# Patient Record
Sex: FEMALE | Race: OTHER | HISPANIC OR LATINO | Employment: FULL TIME | URBAN - METROPOLITAN AREA
[De-identification: names, ages, dates, MRNs, and addresses within clinical notes are randomized per-mention and may not be internally consistent; named-entity substitution may affect disease eponyms.]

---

## 2022-12-22 ENCOUNTER — HOSPITAL ENCOUNTER (EMERGENCY)
Facility: HOSPITAL | Age: 42
Discharge: HOME | End: 2022-12-23
Attending: EMERGENCY MEDICINE

## 2022-12-22 VITALS
OXYGEN SATURATION: 98 % | SYSTOLIC BLOOD PRESSURE: 144 MMHG | HEART RATE: 78 BPM | BODY MASS INDEX: 44.66 KG/M2 | DIASTOLIC BLOOD PRESSURE: 77 MMHG | RESPIRATION RATE: 18 BRPM | WEIGHT: 252.13 LBS | TEMPERATURE: 99 F

## 2022-12-22 DIAGNOSIS — R79.89 ELEVATED TROPONIN: Primary | ICD-10-CM

## 2022-12-22 DIAGNOSIS — R00.2 PALPITATIONS: ICD-10-CM

## 2022-12-22 LAB
ALBUMIN SERPL BCP-MCNC: 4.6 G/DL (ref 3.5–5.2)
ALP SERPL-CCNC: 91 U/L (ref 55–135)
ALT SERPL W/O P-5'-P-CCNC: 42 U/L (ref 10–44)
ANION GAP SERPL CALC-SCNC: 14 MMOL/L (ref 8–16)
AST SERPL-CCNC: 27 U/L (ref 10–40)
BASOPHILS # BLD AUTO: 0.04 K/UL (ref 0–0.2)
BASOPHILS NFR BLD: 0.5 % (ref 0–1.9)
BILIRUB SERPL-MCNC: 0.5 MG/DL (ref 0.1–1)
BUN SERPL-MCNC: 10 MG/DL (ref 6–20)
CALCIUM SERPL-MCNC: 10 MG/DL (ref 8.7–10.5)
CHLORIDE SERPL-SCNC: 104 MMOL/L (ref 95–110)
CO2 SERPL-SCNC: 24 MMOL/L (ref 23–29)
CREAT SERPL-MCNC: 0.8 MG/DL (ref 0.5–1.4)
DIFFERENTIAL METHOD: NORMAL
EOSINOPHIL # BLD AUTO: 0.2 K/UL (ref 0–0.5)
EOSINOPHIL NFR BLD: 2 % (ref 0–8)
ERYTHROCYTE [DISTWIDTH] IN BLOOD BY AUTOMATED COUNT: 12.7 % (ref 11.5–14.5)
EST. GFR  (NO RACE VARIABLE): >60 ML/MIN/1.73 M^2
GLUCOSE SERPL-MCNC: 95 MG/DL (ref 70–110)
HCT VFR BLD AUTO: 39.7 % (ref 37–48.5)
HGB BLD-MCNC: 13.2 G/DL (ref 12–16)
IMM GRANULOCYTES # BLD AUTO: 0.03 K/UL (ref 0–0.04)
IMM GRANULOCYTES NFR BLD AUTO: 0.4 % (ref 0–0.5)
LIPASE SERPL-CCNC: 18 U/L (ref 4–60)
LYMPHOCYTES # BLD AUTO: 2.9 K/UL (ref 1–4.8)
LYMPHOCYTES NFR BLD: 36.6 % (ref 18–48)
MCH RBC QN AUTO: 29 PG (ref 27–31)
MCHC RBC AUTO-ENTMCNC: 33.2 G/DL (ref 32–36)
MCV RBC AUTO: 87 FL (ref 82–98)
MONOCYTES # BLD AUTO: 0.4 K/UL (ref 0.3–1)
MONOCYTES NFR BLD: 5 % (ref 4–15)
NEUTROPHILS # BLD AUTO: 4.4 K/UL (ref 1.8–7.7)
NEUTROPHILS NFR BLD: 55.5 % (ref 38–73)
NRBC BLD-RTO: 0 /100 WBC
PLATELET # BLD AUTO: 174 K/UL (ref 150–450)
PMV BLD AUTO: 12.9 FL (ref 9.2–12.9)
POTASSIUM SERPL-SCNC: 3.9 MMOL/L (ref 3.5–5.1)
PROT SERPL-MCNC: 8.7 G/DL (ref 6–8.4)
RBC # BLD AUTO: 4.55 M/UL (ref 4–5.4)
SODIUM SERPL-SCNC: 142 MMOL/L (ref 136–145)
TROPONIN I SERPL DL<=0.01 NG/ML-MCNC: 0.03 NG/ML (ref 0–0.03)
WBC # BLD AUTO: 7.95 K/UL (ref 3.9–12.7)

## 2022-12-22 PROCEDURE — 93010 EKG 12-LEAD: ICD-10-PCS | Mod: ,,, | Performed by: STUDENT IN AN ORGANIZED HEALTH CARE EDUCATION/TRAINING PROGRAM

## 2022-12-22 PROCEDURE — 83690 ASSAY OF LIPASE: CPT | Performed by: NURSE PRACTITIONER

## 2022-12-22 PROCEDURE — 80053 COMPREHEN METABOLIC PANEL: CPT | Performed by: NURSE PRACTITIONER

## 2022-12-22 PROCEDURE — 93010 ELECTROCARDIOGRAM REPORT: CPT | Mod: ,,, | Performed by: STUDENT IN AN ORGANIZED HEALTH CARE EDUCATION/TRAINING PROGRAM

## 2022-12-22 PROCEDURE — 93005 ELECTROCARDIOGRAM TRACING: CPT

## 2022-12-22 PROCEDURE — 99285 EMERGENCY DEPT VISIT HI MDM: CPT | Mod: 25

## 2022-12-22 PROCEDURE — 84484 ASSAY OF TROPONIN QUANT: CPT | Performed by: NURSE PRACTITIONER

## 2022-12-22 PROCEDURE — 85025 COMPLETE CBC W/AUTO DIFF WBC: CPT | Performed by: NURSE PRACTITIONER

## 2022-12-23 LAB
B-HCG UR QL: NEGATIVE
BACTERIA #/AREA URNS HPF: NORMAL /HPF
BILIRUB UR QL STRIP: NEGATIVE
CLARITY UR: CLEAR
COLOR UR: YELLOW
GLUCOSE UR QL STRIP: NEGATIVE
HGB UR QL STRIP: ABNORMAL
KETONES UR QL STRIP: NEGATIVE
LEUKOCYTE ESTERASE UR QL STRIP: NEGATIVE
MICROSCOPIC COMMENT: NORMAL
NITRITE UR QL STRIP: NEGATIVE
PH UR STRIP: 6 [PH] (ref 5–8)
PROT UR QL STRIP: ABNORMAL
RBC #/AREA URNS HPF: 2 /HPF (ref 0–4)
SP GR UR STRIP: 1.02 (ref 1–1.03)
SQUAMOUS #/AREA URNS HPF: 6 /HPF
TROPONIN I SERPL DL<=0.01 NG/ML-MCNC: 0.01 NG/ML (ref 0–0.03)
URN SPEC COLLECT METH UR: ABNORMAL
UROBILINOGEN UR STRIP-ACNC: NEGATIVE EU/DL
WBC #/AREA URNS HPF: 4 /HPF (ref 0–5)

## 2022-12-23 PROCEDURE — 81000 URINALYSIS NONAUTO W/SCOPE: CPT | Performed by: NURSE PRACTITIONER

## 2022-12-23 PROCEDURE — 25000003 PHARM REV CODE 250: Performed by: EMERGENCY MEDICINE

## 2022-12-23 PROCEDURE — 84484 ASSAY OF TROPONIN QUANT: CPT | Performed by: EMERGENCY MEDICINE

## 2022-12-23 PROCEDURE — 81025 URINE PREGNANCY TEST: CPT | Performed by: NURSE PRACTITIONER

## 2022-12-23 RX ORDER — PANTOPRAZOLE SODIUM 40 MG/1
40 TABLET, DELAYED RELEASE ORAL
Status: COMPLETED | OUTPATIENT
Start: 2022-12-23 | End: 2022-12-23

## 2022-12-23 RX ORDER — ASPIRIN 325 MG
325 TABLET ORAL
Status: COMPLETED | OUTPATIENT
Start: 2022-12-23 | End: 2022-12-23

## 2022-12-23 RX ADMIN — PANTOPRAZOLE SODIUM 40 MG: 40 TABLET, DELAYED RELEASE ORAL at 02:12

## 2022-12-23 RX ADMIN — ASPIRIN 325 MG ORAL TABLET 325 MG: 325 PILL ORAL at 02:12

## 2022-12-23 NOTE — ED PROVIDER NOTES
SCRIBE #1 NOTE: I, Marilee Madrid, am scribing for, and in the presence of, Ahsan Lewis MD. I have scribed the entire note.       History     Chief Complaint   Patient presents with    Abdominal Pain     Pt states increase in abd pain w/ nausea and reflux since April. Pt also states recent feeling palpitations. Denies any cardiac hx.      Review of patient's allergies indicates:  No Known Allergies      History of Present Illness     HPI    12/23/2022, 12:22 AM  History obtained from the patient      History of Present Illness: Sandrine Rhodes is a 42 y.o. female patient with a PMHx of tubal ligation who presents to the Emergency Department for evaluation of intermittent palpations which onset gradually 2 weeks. Symptoms are constant and moderate in severity. No mitigating or exacerbating factors reported. Associated sxs include SOB, and LUQ epigastric pain. Patient denies any CP, fever, chills, N/V/D, dysuria,HA, weakness and all other sxs at this time. No prior Tx . No further complaints or concerns at this time.       Arrival mode: Personal vehicle    PCP: Primary Doctor No        Past Medical History:  No past medical history on file.    Past Surgical History:  Past Surgical History:   Procedure Laterality Date    TUBAL LIGATION           Family History:  Family History   Problem Relation Age of Onset    Hyperlipidemia Mother     Diabetes Maternal Grandmother        Social History:  Social History     Tobacco Use    Smoking status: Never    Smokeless tobacco: Not on file   Substance and Sexual Activity    Alcohol use: No    Drug use: No    Sexual activity: Not on file        Review of Systems     Review of Systems   Constitutional:  Negative for chills and fever.   HENT:  Negative for sore throat.    Respiratory:  Positive for shortness of breath. Negative for cough.    Cardiovascular:  Positive for palpitations. Negative for chest pain.   Gastrointestinal:  Negative for diarrhea, nausea and vomiting.    Genitourinary:  Negative for dysuria.   Musculoskeletal:  Negative for back pain.   Skin:  Negative for rash.   Neurological:  Negative for weakness and headaches.   Hematological:  Does not bruise/bleed easily.   All other systems reviewed and are negative.     Physical Exam     Initial Vitals [12/22/22 2104]   BP Pulse Resp Temp SpO2   (!) 144/77 78 18 98.5 °F (36.9 °C) 98 %      MAP       --          Physical Exam  Nursing Notes and Vital Signs Reviewed.  Constitutional: Patient is in no acute distress. Well-developed and well-nourished.  Head: Atraumatic. Normocephalic.  Eyes: PERRL. EOM intact. Conjunctivae are not pale. No scleral icterus.  ENT: Mucous membranes are moist.   Neck: Supple. Full ROM. No lymphadenopathy.  Cardiovascular: Regular rate. Regular rhythm. No murmurs, rubs, or gallops. Distal pulses are 2+ and symmetric.  Pulmonary/Chest: No respiratory distress. Clear to auscultation bilaterally. No wheezing or rales.  Abdominal: Soft and non-distended.  There is no tenderness.  No rebound, guarding, or rigidity. LUQ epigastric pain  Genitourinary: No CVA tenderness  Musculoskeletal: Moves all extremities. No obvious deformities. Trace edema. No calf tenderness.  Skin: Warm and dry.  Neurological:  Alert, awake, and appropriate.  Normal speech.  No acute focal neurological deficits are appreciated.  Psychiatric: Normal affect. Good eye contact. Appropriate in content.     ED Course   Procedures  ED Vital Signs:  Vitals:    12/22/22 2104   BP: (!) 144/77   Pulse: 78   Resp: 18   Temp: 98.5 °F (36.9 °C)   TempSrc: Oral   SpO2: 98%   Weight: 114.4 kg (252 lb 1.5 oz)       Abnormal Lab Results:  Labs Reviewed   COMPREHENSIVE METABOLIC PANEL - Abnormal; Notable for the following components:       Result Value    Total Protein 8.7 (*)     All other components within normal limits   TROPONIN I - Abnormal; Notable for the following components:    Troponin I 0.032 (*)     All other components within normal  limits   URINALYSIS, REFLEX TO URINE CULTURE - Abnormal; Notable for the following components:    Protein, UA Trace (*)     Occult Blood UA 1+ (*)     All other components within normal limits    Narrative:     Specimen Source->Urine   CBC W/ AUTO DIFFERENTIAL   PREGNANCY TEST, URINE RAPID    Narrative:     Specimen Source->Urine   LIPASE   TROPONIN I   URINALYSIS MICROSCOPIC    Narrative:     Specimen Source->Urine        All Lab Results:  Results for orders placed or performed during the hospital encounter of 12/22/22   CBC auto differential   Result Value Ref Range    WBC 7.95 3.90 - 12.70 K/uL    RBC 4.55 4.00 - 5.40 M/uL    Hemoglobin 13.2 12.0 - 16.0 g/dL    Hematocrit 39.7 37.0 - 48.5 %    MCV 87 82 - 98 fL    MCH 29.0 27.0 - 31.0 pg    MCHC 33.2 32.0 - 36.0 g/dL    RDW 12.7 11.5 - 14.5 %    Platelets 174 150 - 450 K/uL    MPV 12.9 9.2 - 12.9 fL    Immature Granulocytes 0.4 0.0 - 0.5 %    Gran # (ANC) 4.4 1.8 - 7.7 K/uL    Immature Grans (Abs) 0.03 0.00 - 0.04 K/uL    Lymph # 2.9 1.0 - 4.8 K/uL    Mono # 0.4 0.3 - 1.0 K/uL    Eos # 0.2 0.0 - 0.5 K/uL    Baso # 0.04 0.00 - 0.20 K/uL    nRBC 0 0 /100 WBC    Gran % 55.5 38.0 - 73.0 %    Lymph % 36.6 18.0 - 48.0 %    Mono % 5.0 4.0 - 15.0 %    Eosinophil % 2.0 0.0 - 8.0 %    Basophil % 0.5 0.0 - 1.9 %    Differential Method Automated    Comprehensive metabolic panel   Result Value Ref Range    Sodium 142 136 - 145 mmol/L    Potassium 3.9 3.5 - 5.1 mmol/L    Chloride 104 95 - 110 mmol/L    CO2 24 23 - 29 mmol/L    Glucose 95 70 - 110 mg/dL    BUN 10 6 - 20 mg/dL    Creatinine 0.8 0.5 - 1.4 mg/dL    Calcium 10.0 8.7 - 10.5 mg/dL    Total Protein 8.7 (H) 6.0 - 8.4 g/dL    Albumin 4.6 3.5 - 5.2 g/dL    Total Bilirubin 0.5 0.1 - 1.0 mg/dL    Alkaline Phosphatase 91 55 - 135 U/L    AST 27 10 - 40 U/L    ALT 42 10 - 44 U/L    Anion Gap 14 8 - 16 mmol/L    eGFR >60 >60 mL/min/1.73 m^2   Troponin I   Result Value Ref Range    Troponin I 0.032 (H) 0.000 - 0.026 ng/mL    Urinalysis, Reflex to Urine Culture Urine, Clean Catch    Specimen: Urine   Result Value Ref Range    Specimen UA Urine, Clean Catch     Color, UA Yellow Yellow, Straw, Cynthia    Appearance, UA Clear Clear    pH, UA 6.0 5.0 - 8.0    Specific Gravity, UA 1.025 1.005 - 1.030    Protein, UA Trace (A) Negative    Glucose, UA Negative Negative    Ketones, UA Negative Negative    Bilirubin (UA) Negative Negative    Occult Blood UA 1+ (A) Negative    Nitrite, UA Negative Negative    Urobilinogen, UA Negative <2.0 EU/dL    Leukocytes, UA Negative Negative   Pregnancy, urine rapid (UPT)   Result Value Ref Range    Preg Test, Ur Negative    Lipase   Result Value Ref Range    Lipase 18 4 - 60 U/L   Troponin I   Result Value Ref Range    Troponin I 0.009 0.000 - 0.026 ng/mL   Urinalysis Microscopic   Result Value Ref Range    RBC, UA 2 0 - 4 /hpf    WBC, UA 4 0 - 5 /hpf    Bacteria Rare None-Occ /hpf    Squam Epithel, UA 6 /hpf    Microscopic Comment SEE COMMENT          Imaging Results:  Imaging Results              X-Ray Chest AP Portable (Final result)  Result time 12/22/22 21:24:12      Final result by Robb Rebolledo MD (12/22/22 21:24:12)                   Impression:      No acute cardiopulmonary disease.      Electronically signed by: Robb Rebolledo MD  Date:    12/22/2022  Time:    21:24               Narrative:    EXAMINATION:  XR CHEST AP PORTABLE    CLINICAL HISTORY:  Palpitations    COMPARISON:  None.    FINDINGS:  The lungs are clear. The cardiac silhouette is within normal limits. No pleural effusion or pneumothorax or pulmonary edema.                                       The EKG was ordered, reviewed, and independently interpreted by the ED provider.  Interpretation time: 21:56  Rate: 64 BPM  Rhythm: normal sinus rhythm  Interpretation: Minmial voltage criteria for lvh. No STEMI.           The Emergency Provider reviewed the vital signs and test results, which are outlined above.     ED Discussion       ED Course  as of 12/23/22 0708   Fri Dec 23, 2022   0337 I discussed with hospital medicine, Tia Tierney, GISSEL. She asked that we repeat troponin and if trending down patient could possibly be discharged. Patient with HEART score of <4. She is no longer in pain at all. Troponin has normalized. She is unlikely to have occlusive MI today. She would like to go home, discussed the need to f/u with patient and her daughter, patient agreed. Will return with any worsening symptoms.  [BA]   0338 3:38 AM Reassessment: I reassessed the pt.  The pt is resting comfortably and is NAD.  Pt states their sx have improved. Discussed test results, shared treatment plan, specific conditions for return, and the need for f/u.  Answered their questions at this time.  Pt understands and agrees to the plan.  The pt has remained hemodynamically stable through ED course and is stable for discharge.    [BA]      ED Course User Index  [BA] Ahsan Lewis MD               ED MEDICATIONS:  Medications   pantoprazole EC tablet 40 mg (40 mg Oral Given 12/23/22 0220)   aspirin tablet 325 mg (325 mg Oral Given 12/23/22 0220)                         ED Medication(s):  Medications   pantoprazole EC tablet 40 mg (40 mg Oral Given 12/23/22 0220)   aspirin tablet 325 mg (325 mg Oral Given 12/23/22 0220)       Discharge Medication List as of 12/23/2022  3:39 AM           Follow-up Information       [facility]. Schedule an appointment as soon as possible for a visit in 2 days.    Specialty: Cardiology  Why: For re-evaluation and further treatment  Contact information:  [address] [facility] [address] [phone]  Additional information:  Please park on the [address] side and use the Clinic entrance. Check in on the 3rd floor, to the right.             Dr. Cevallos. Call in 1 day.    Why: For re-evaluation and further treatment                               Scribe Attestation:   Scribe #1: I performed the above scribed  service and the documentation accurately describes the services I performed. I attest to the accuracy of the note.     Attending:   Physician Attestation Statement for Scribe #1: I, Ahsan Lewis MD, personally performed the services described in this documentation, as scribed by Marilee Madrid, in my presence, and it is both accurate and complete.           Clinical Impression       ICD-10-CM ICD-9-CM   1. Elevated troponin  R77.8 790.6   2. Palpitations  R00.2 785.1       Disposition:   Disposition: Discharged  Condition: Stable       Ahsan Lewis MD  12/23/22 0708

## 2022-12-23 NOTE — FIRST PROVIDER EVALUATION
Medical screening examination initiated.  I have conducted a focused provider triage encounter, findings are as follows:    Brief history of present illness:  abdominal pain and palpitations     Vitals:    12/22/22 2104   BP: (!) 144/77   BP Location: Right arm   Patient Position: Sitting   Pulse: 78   Resp: 18   Temp: 98.5 °F (36.9 °C)   TempSrc: Oral   SpO2: 98%   Weight: 114.4 kg (252 lb 1.5 oz)       Pertinent physical exam:  nad    Brief workup plan:  labs, ekg, imaging     Preliminary workup initiated; this workup will be continued and followed by the physician or advanced practice provider that is assigned to the patient when roomed.

## 2023-01-13 ENCOUNTER — OFFICE VISIT (OUTPATIENT)
Dept: CARDIOLOGY | Facility: CLINIC | Age: 43
End: 2023-01-13

## 2023-01-13 VITALS
BODY MASS INDEX: 46.08 KG/M2 | SYSTOLIC BLOOD PRESSURE: 132 MMHG | OXYGEN SATURATION: 99 % | HEART RATE: 63 BPM | DIASTOLIC BLOOD PRESSURE: 72 MMHG | WEIGHT: 260.13 LBS

## 2023-01-13 DIAGNOSIS — G47.30 SLEEP APNEA, UNSPECIFIED TYPE: ICD-10-CM

## 2023-01-13 DIAGNOSIS — K21.9 GASTROESOPHAGEAL REFLUX DISEASE, UNSPECIFIED WHETHER ESOPHAGITIS PRESENT: ICD-10-CM

## 2023-01-13 DIAGNOSIS — R00.2 PALPITATIONS: ICD-10-CM

## 2023-01-13 DIAGNOSIS — R79.89 ELEVATED TROPONIN: Primary | ICD-10-CM

## 2023-01-13 DIAGNOSIS — R07.89 OTHER CHEST PAIN: ICD-10-CM

## 2023-01-13 DIAGNOSIS — Z00.00 GENERAL MEDICAL EXAMINATION: ICD-10-CM

## 2023-01-13 PROCEDURE — 99204 PR OFFICE/OUTPT VISIT, NEW, LEVL IV, 45-59 MIN: ICD-10-PCS | Mod: S$PBB,,, | Performed by: INTERNAL MEDICINE

## 2023-01-13 PROCEDURE — 99214 OFFICE O/P EST MOD 30 MIN: CPT | Mod: PBBFAC | Performed by: INTERNAL MEDICINE

## 2023-01-13 PROCEDURE — 99204 OFFICE O/P NEW MOD 45 MIN: CPT | Mod: S$PBB,,, | Performed by: INTERNAL MEDICINE

## 2023-01-13 PROCEDURE — 99999 PR PBB SHADOW E&M-EST. PATIENT-LVL IV: ICD-10-PCS | Mod: PBBFAC,,, | Performed by: INTERNAL MEDICINE

## 2023-01-13 PROCEDURE — 99999 PR PBB SHADOW E&M-EST. PATIENT-LVL IV: CPT | Mod: PBBFAC,,, | Performed by: INTERNAL MEDICINE

## 2023-01-13 RX ORDER — FAMOTIDINE 20 MG/1
20 TABLET, FILM COATED ORAL 2 TIMES DAILY
Qty: 60 TABLET | Refills: 3 | Status: SHIPPED | OUTPATIENT
Start: 2023-01-13

## 2023-01-13 NOTE — PROGRESS NOTES
Subjective:   Patient ID:  Sandrine Rhodes is a 42 y.o. female who presents for cardiac consult of No chief complaint on file.    Referring by: Ahsan Lewis MD   Reason for consult: elevated Trop    HPI  The patient came in today for cardiac consult of No chief complaint on file.    Sandrine Rhodes is a 42 y.o. female with obesity, GERD presents CVD eval.     1/13/23    Er eval  12/23/2022, 12:22 AM  History obtained from the patient                  History of Present Illness: Sandrine Rhodes is a 42 y.o. female patient with a PMHx of tubal ligation who presents to the Emergency Department for evaluation of intermittent palpations which onset gradually 2 weeks. Symptoms are constant and moderate in severity. No mitigating or exacerbating factors reported. Associated sxs include SOB, and LUQ epigastric pain. Patient denies any CP, fever, chills, N/V/D, dysuria,HA, weakness and all other sxs at this time. No prior Tx . No further complaints or concerns at this time.     Troponin I 0.000 - 0.026 ng/mL 0.009  0.032 High  CM        Work up in Er initial trop positive, next neg. ECG NSR, LVH. BP and Hr well controlled. She still has occ CP/SOB palpitations.     Patient feels no leg swelling, no PND, no dizziness, no syncope, no CNS symptoms.    Patient has fairly good exercise tolerance.    Patient is compliant with medications.    FH - grandmother - has CVD      Normal sinus rhythm   Minimal voltage criteria for LVH, may be normal variant ( R in aVL )   Borderline Abnormal ECG   No previous ECGs available   Confirmed by Alonso CHAVEZ, Aracely' B. ([id number]) on 12/24/2022 6:20:09 AM     History reviewed. No pertinent past medical history.    Past Surgical History:   Procedure Laterality Date    TUBAL LIGATION         Social History     Tobacco Use    Smoking status: Never   Substance Use Topics    Alcohol use: No    Drug use: No       Family History   Problem Relation Age of Onset    Hyperlipidemia  Mother     Diabetes Maternal Grandmother        Patient's Medications   New Prescriptions    No medications on file   Previous Medications    AMOXICILLIN (AMOXIL) 500 MG CAPSULE    Take 500 mg by mouth 3 (three) times daily.    SUCRALFATE (CARAFATE) 1 GRAM TABLET    Take 1 tablet (1 g total) by mouth 4 (four) times daily before meals and nightly.   Modified Medications    Modified Medication Previous Medication    FAMOTIDINE (PEPCID) 20 MG TABLET famotidine (PEPCID) 20 MG tablet       Take 1 tablet (20 mg total) by mouth 2 (two) times daily.    Take 20 mg by mouth 2 (two) times daily.   Discontinued Medications    No medications on file       Review of Systems   Constitutional:  Positive for malaise/fatigue.   HENT: Negative.     Eyes: Negative.    Respiratory:  Positive for shortness of breath.    Cardiovascular:  Positive for chest pain and palpitations.   Gastrointestinal: Negative.    Genitourinary: Negative.    Musculoskeletal: Negative.    Skin: Negative.    Neurological: Negative.    Endo/Heme/Allergies: Negative.    Psychiatric/Behavioral: Negative.     All 12 systems otherwise negative.    Wt Readings from Last 3 Encounters:   01/13/23 118 kg (260 lb 2.3 oz)   12/22/22 114.4 kg (252 lb 1.5 oz)   07/08/15 90.7 kg (200 lb)     Temp Readings from Last 3 Encounters:   12/22/22 98.5 °F (36.9 °C) (Oral)   07/08/15 98.5 °F (36.9 °C) (Oral)   06/16/13 100.4 °F (38 °C) (Oral)     BP Readings from Last 3 Encounters:   01/13/23 132/72   12/22/22 (!) 144/77   07/08/15 111/75     Pulse Readings from Last 3 Encounters:   01/13/23 63   12/22/22 78   07/08/15 (!) 51       /72   Pulse 63   Wt 118 kg (260 lb 2.3 oz)   SpO2 99%   BMI 46.08 kg/m²     Objective:   Physical Exam  Vitals and nursing note reviewed.   Constitutional:       General: She is not in acute distress.     Appearance: She is well-developed. She is not diaphoretic.   HENT:      Head: Normocephalic and atraumatic.      Nose: Nose normal.   Eyes:       General: No scleral icterus.     Conjunctiva/sclera: Conjunctivae normal.   Neck:      Thyroid: No thyromegaly.      Vascular: No JVD.   Cardiovascular:      Rate and Rhythm: Normal rate and regular rhythm.      Heart sounds: S1 normal and S2 normal. No murmur heard.    No friction rub. No gallop. No S3 or S4 sounds.   Pulmonary:      Effort: Pulmonary effort is normal. No respiratory distress.      Breath sounds: Normal breath sounds. No stridor. No wheezing or rales.   Chest:      Chest wall: No tenderness.   Abdominal:      General: Bowel sounds are normal. There is no distension.      Palpations: Abdomen is soft. There is no mass.      Tenderness: There is no abdominal tenderness. There is no rebound.   Genitourinary:     Comments: Deferred  Musculoskeletal:         General: No tenderness or deformity. Normal range of motion.      Cervical back: Normal range of motion and neck supple.   Lymphadenopathy:      Cervical: No cervical adenopathy.   Skin:     General: Skin is warm and dry.      Coloration: Skin is not pale.      Findings: No erythema or rash.   Neurological:      Mental Status: She is alert and oriented to person, place, and time.      Motor: No abnormal muscle tone.      Coordination: Coordination normal.   Psychiatric:         Behavior: Behavior normal.         Thought Content: Thought content normal.         Judgment: Judgment normal.       Lab Results   Component Value Date     12/22/2022    K 3.9 12/22/2022     12/22/2022    CO2 24 12/22/2022    BUN 10 12/22/2022    CREATININE 0.8 12/22/2022    GLU 95 12/22/2022    AST 27 12/22/2022    ALT 42 12/22/2022    ALBUMIN 4.6 12/22/2022    PROT 8.7 (H) 12/22/2022    BILITOT 0.5 12/22/2022    WBC 7.95 12/22/2022    HGB 13.2 12/22/2022    HCT 39.7 12/22/2022    MCV 87 12/22/2022     12/22/2022     Assessment:      1. Elevated troponin    2. Palpitations    3. General medical examination    4. Other chest pain    5. Gastroesophageal  reflux disease, unspecified whether esophagitis present    6. Sleep apnea, unspecified type        Plan:     CP, Palpitations with elevated Trop  - recent elevated trop initially, second neg  - needs ischemic eval and ECHO  - Holter 48 hr  - order ECG stress test and ECHO  - r/o JESIKA     2. GERD  - cont PPI - refills given   - f/u PCP/GI        Thank you for allowing me to participate in this patient's care. Please do not hesitate to contact me with any questions or concerns. Consult note has been forwarded to the referral physician.

## 2023-01-18 ENCOUNTER — TELEPHONE (OUTPATIENT)
Dept: CARDIOLOGY | Facility: CLINIC | Age: 43
End: 2023-01-18

## 2023-01-18 NOTE — TELEPHONE ENCOUNTER
[facility] - Cardiology  Medical Specialty       Patient Name: Sandrine Rhodes  MRN: [id number]  Primary Care Physician: Primary Doctor No  Reason for Referral: Community Resources    Spoke w/ pt regarding cardio referral for resources. Informed pt ab sliding scale fees at low-cost clinics in [place] area ([facility], [facility] & [facility]). Pt informed me she has been informed of these resources in the past.     Pt struggles financially and does not have medical coverage. She was interested in finding a low-cost option for her current medical care needs. Unfortunately, neither clinic has a cardio dept. ([facility], however, does have a gastro dept) Pt was worried about being able to pay for the next appt w/ [facility] cardio. Informed pt that [facility] supervisor, Esa, has sent a referral to [facility] financial assistance dept.    Plan: Will follow up with pt after cardio appt on 1/24. Gave pt office # in case there are other questions or needs in the meantime.      KAY Doyle Intern  Palliative Medicine & Medical Specialties  [phone]

## 2023-01-24 ENCOUNTER — HOSPITAL ENCOUNTER (OUTPATIENT)
Dept: CARDIOLOGY | Facility: HOSPITAL | Age: 43
Discharge: HOME | End: 2023-01-24
Attending: INTERNAL MEDICINE

## 2023-01-24 ENCOUNTER — TELEPHONE (OUTPATIENT)
Dept: CARDIOLOGY | Facility: CLINIC | Age: 43
End: 2023-01-24

## 2023-01-24 VITALS
SYSTOLIC BLOOD PRESSURE: 132 MMHG | DIASTOLIC BLOOD PRESSURE: 72 MMHG | HEIGHT: 63 IN | BODY MASS INDEX: 46.07 KG/M2 | WEIGHT: 260 LBS

## 2023-01-24 DIAGNOSIS — R00.2 PALPITATIONS: ICD-10-CM

## 2023-01-24 DIAGNOSIS — R79.89 ELEVATED TROPONIN: ICD-10-CM

## 2023-01-24 DIAGNOSIS — R07.89 OTHER CHEST PAIN: ICD-10-CM

## 2023-01-24 LAB
AORTIC ROOT ANNULUS: 3.51 CM
ASCENDING AORTA: 3.1 CM
AV INDEX (PROSTH): 0.92
AV MEAN GRADIENT: 3 MMHG
AV PEAK GRADIENT: 6 MMHG
AV VALVE AREA: 3.03 CM2
AV VELOCITY RATIO: 0.93
BSA FOR ECHO PROCEDURE: 2.29 M2
CV ECHO LV RWT: 0.4 CM
DOP CALC AO PEAK VEL: 1.22 M/S
DOP CALC AO VTI: 27.1 CM
DOP CALC LVOT AREA: 3.3 CM2
DOP CALC LVOT DIAMETER: 2.05 CM
DOP CALC LVOT PEAK VEL: 1.13 M/S
DOP CALC LVOT STROKE VOLUME: 82.14 CM3
DOP CALC RVOT PEAK VEL: 0.8 M/S
DOP CALC RVOT VTI: 17.2 CM
DOP CALCLVOT PEAK VEL VTI: 24.9 CM
E WAVE DECELERATION TIME: 199.24 MSEC
E/A RATIO: 1.39
E/E' RATIO: 7.92 M/S
ECHO LV POSTERIOR WALL: 0.96 CM (ref 0.6–1.1)
EJECTION FRACTION: 55 %
FRACTIONAL SHORTENING: 36 % (ref 28–44)
INTERVENTRICULAR SEPTUM: 1.06 CM (ref 0.6–1.1)
IVC DIAMETER: 1.42 CM
IVRT: 114.18 MSEC
LA MAJOR: 5.44 CM
LA MINOR: 5.62 CM
LA WIDTH: 3.8 CM
LEFT ATRIUM SIZE: 3.73 CM
LEFT ATRIUM VOLUME INDEX MOD: 26.6 ML/M2
LEFT ATRIUM VOLUME INDEX: 30.8 ML/M2
LEFT ATRIUM VOLUME MOD: 57.47 CM3
LEFT ATRIUM VOLUME: 66.61 CM3
LEFT INTERNAL DIMENSION IN SYSTOLE: 3.06 CM (ref 2.1–4)
LEFT VENTRICLE DIASTOLIC VOLUME INDEX: 48.71 ML/M2
LEFT VENTRICLE DIASTOLIC VOLUME: 105.21 ML
LEFT VENTRICLE MASS INDEX: 79 G/M2
LEFT VENTRICLE SYSTOLIC VOLUME INDEX: 17 ML/M2
LEFT VENTRICLE SYSTOLIC VOLUME: 36.62 ML
LEFT VENTRICULAR INTERNAL DIMENSION IN DIASTOLE: 4.76 CM (ref 3.5–6)
LEFT VENTRICULAR MASS: 170.17 G
LV LATERAL E/E' RATIO: 7.07 M/S
LV SEPTAL E/E' RATIO: 9 M/S
LVOT MG: 2.88 MMHG
LVOT MV: 0.79 CM/S
MV PEAK A VEL: 0.71 M/S
MV PEAK E VEL: 0.99 M/S
MV STENOSIS PRESSURE HALF TIME: 57.78 MS
MV VALVE AREA P 1/2 METHOD: 3.81 CM2
PISA TR MAX VEL: 2.56 M/S
PULM VEIN S/D RATIO: 1.49
PV MEAN GRADIENT: 1.28 MMHG
PV PEAK D VEL: 0.39 M/S
PV PEAK S VEL: 0.58 M/S
PV PEAK VELOCITY: 1.04 CM/S
RA MAJOR: 4.85 CM
RA PRESSURE: 3 MMHG
RA WIDTH: 3.02 CM
RIGHT VENTRICULAR END-DIASTOLIC DIMENSION: 2.97 CM
SINUS: 3.4 CM
STJ: 2.91 CM
TDI LATERAL: 0.14 M/S
TDI SEPTAL: 0.11 M/S
TDI: 0.13 M/S
TR MAX PG: 26 MMHG
TRICUSPID ANNULAR PLANE SYSTOLIC EXCURSION: 2.12 CM
TV REST PULMONARY ARTERY PRESSURE: 29 MMHG

## 2023-01-24 PROCEDURE — 93227 HOLTER MONITOR - 48 HOUR (CUPID ONLY): ICD-10-PCS | Mod: ,,, | Performed by: INTERNAL MEDICINE

## 2023-01-24 PROCEDURE — 93018 CV STRESS TEST I&R ONLY: CPT | Mod: ,,, | Performed by: INTERNAL MEDICINE

## 2023-01-24 PROCEDURE — 93018 EXERCISE STRESS - EKG (CUPID ONLY): ICD-10-PCS | Mod: ,,, | Performed by: INTERNAL MEDICINE

## 2023-01-24 PROCEDURE — 93306 TTE W/DOPPLER COMPLETE: CPT | Mod: 26,,, | Performed by: INTERNAL MEDICINE

## 2023-01-24 PROCEDURE — 93016 CV STRESS TEST SUPVJ ONLY: CPT | Mod: ,,, | Performed by: INTERNAL MEDICINE

## 2023-01-24 PROCEDURE — 93227 XTRNL ECG REC<48 HR R&I: CPT | Mod: ,,, | Performed by: INTERNAL MEDICINE

## 2023-01-24 PROCEDURE — 93016 EXERCISE STRESS - EKG (CUPID ONLY): ICD-10-PCS | Mod: ,,, | Performed by: INTERNAL MEDICINE

## 2023-01-24 PROCEDURE — 93306 ECHO (CUPID ONLY): ICD-10-PCS | Mod: 26,,, | Performed by: INTERNAL MEDICINE

## 2023-01-24 PROCEDURE — 93306 TTE W/DOPPLER COMPLETE: CPT

## 2023-01-24 PROCEDURE — 93225 XTRNL ECG REC<48 HRS REC: CPT

## 2023-01-24 PROCEDURE — 93017 CV STRESS TEST TRACING ONLY: CPT

## 2023-01-24 NOTE — TELEPHONE ENCOUNTER
LVM for pt       Please contact the patient and let them know that their results of echo reveal Overall normal heart function and valves.

## 2023-01-25 ENCOUNTER — TELEPHONE (OUTPATIENT)
Dept: CARDIOLOGY | Facility: CLINIC | Age: 43
End: 2023-01-25

## 2023-01-25 LAB
CV STRESS BASE HR: 72 BPM
DIASTOLIC BLOOD PRESSURE: 58 MMHG
OHS CV CPX 1 MINUTE RECOVERY HEART RATE: 133 BPM
OHS CV CPX 85 PERCENT MAX PREDICTED HEART RATE MALE: 143
OHS CV CPX ESTIMATED METS: 8
OHS CV CPX MAX PREDICTED HEART RATE: 168
OHS CV CPX PATIENT IS FEMALE: 1
OHS CV CPX PATIENT IS MALE: 0
OHS CV CPX PEAK DIASTOLIC BLOOD PRESSURE: 57 MMHG
OHS CV CPX PEAK HEAR RATE: 151 BPM
OHS CV CPX PEAK RATE PRESSURE PRODUCT: NORMAL
OHS CV CPX PEAK SYSTOLIC BLOOD PRESSURE: 193 MMHG
OHS CV CPX PERCENT MAX PREDICTED HEART RATE ACHIEVED: 90
OHS CV CPX RATE PRESSURE PRODUCT PRESENTING: NORMAL
STRESS ECHO POST EXERCISE DUR MIN: 6 MINUTES
STRESS ECHO POST EXERCISE DUR SEC: 34 SECONDS
SYSTOLIC BLOOD PRESSURE: 166 MMHG

## 2023-01-25 NOTE — TELEPHONE ENCOUNTER
Spoke with pt in regards to     Please contact the patient and let them know that their results of ECG stress test are normal with good exercise capacity.       Pt verbalized understanding with no questions or concerns

## 2023-01-26 LAB
OHS CV EVENT MONITOR DAY: 0
OHS CV HOLTER LENGTH DECIMAL HOURS: 24
OHS CV HOLTER LENGTH HOURS: 24
OHS CV HOLTER LENGTH MINUTES: 0
OHS CV HOLTER SINUS AVERAGE HR: 79
OHS CV HOLTER SINUS MAX HR: 156
OHS CV HOLTER SINUS MIN HR: 50

## 2023-01-27 ENCOUNTER — TELEPHONE (OUTPATIENT)
Dept: CARDIOLOGY | Facility: CLINIC | Age: 43
End: 2023-01-27

## 2023-01-27 NOTE — TELEPHONE ENCOUNTER
Lvm  for pt in regards to       Please contact the patient and let them know that their results of Holter reveal normal heart monitor with rare extra beats, normal average heart rates.

## 2023-04-03 ENCOUNTER — OFFICE VISIT (OUTPATIENT)
Dept: GASTROENTEROLOGY | Facility: CLINIC | Age: 43
End: 2023-04-03

## 2023-04-03 VITALS — HEART RATE: 86 BPM | BODY MASS INDEX: 45.08 KG/M2 | WEIGHT: 254.44 LBS | HEIGHT: 63 IN

## 2023-04-03 DIAGNOSIS — R14.0 BLOATING: ICD-10-CM

## 2023-04-03 DIAGNOSIS — K21.9 GASTROESOPHAGEAL REFLUX DISEASE, UNSPECIFIED WHETHER ESOPHAGITIS PRESENT: Primary | ICD-10-CM

## 2023-04-03 PROCEDURE — 99203 OFFICE O/P NEW LOW 30 MIN: CPT | Mod: S$PBB,,, | Performed by: INTERNAL MEDICINE

## 2023-04-03 PROCEDURE — 99214 OFFICE O/P EST MOD 30 MIN: CPT | Mod: PBBFAC | Performed by: INTERNAL MEDICINE

## 2023-04-03 PROCEDURE — 99999 PR PBB SHADOW E&M-EST. PATIENT-LVL IV: CPT | Mod: PBBFAC,,, | Performed by: INTERNAL MEDICINE

## 2023-04-03 PROCEDURE — 99999 PR PBB SHADOW E&M-EST. PATIENT-LVL IV: ICD-10-PCS | Mod: PBBFAC,,, | Performed by: INTERNAL MEDICINE

## 2023-04-03 PROCEDURE — 99203 PR OFFICE/OUTPT VISIT, NEW, LEVL III, 30-44 MIN: ICD-10-PCS | Mod: S$PBB,,, | Performed by: INTERNAL MEDICINE

## 2023-04-03 RX ORDER — OMEPRAZOLE 40 MG/1
40 CAPSULE, DELAYED RELEASE ORAL DAILY
Qty: 30 CAPSULE | Refills: 11 | Status: SHIPPED | OUTPATIENT
Start: 2023-04-03 | End: 2024-04-02

## 2023-04-03 NOTE — PROGRESS NOTES
"Clinic Consult:  [facility] Consultation Note    Reason for Consult:  The primary encounter diagnosis was Gastroesophageal reflux disease, unspecified whether esophagitis present. A diagnosis of Bloating was also pertinent to this visit.    PCP: Primary Doctor No   [id number] / [place] [id number]    HPI:  This is a 42 y.o. female here for evaluation of gerd.    History obtained via .      Says she went to the ER in Dec.  Troponin was elevated but then second was normal.  says she felt palpitations went she went to ER, as well as dizziness.  Says the dizziness is better but the palpitations.      Endorses bloat.  Has "burning" with spicy food.  Has to drink milk to "calm it down".  Feels reflux in her esophagus.      Cardiac work up with Dr. Lazo:  Echo: 1/2023: normal   Holter: 1/2023: normal       ROS:  CONSTITUTIONAL: Denies weight change,  fatigue, fevers, chills, night sweats.  EYES: No changes in vision.   ENT: No oral lesions or sore throat.  HEMATOLOGICAL/Lymph: Denies bleeding tendency, bruising tendency. No swellings or enlarged lymph nodes.  CARDIOVASCULAR: Denies chest pain, shortness of breath, orthopnea and edema.  RESPIRATORY: Denies cough, hemoptysis, dyspnea, and wheezing.  GI: See HPI.  : Denies dysuria and hematuria  MUSCULOSKELETAL: Denies joint pain or swelling, back pain and muscle pain.  SKIN: Denies rashes.  NEUROLOGIC: Denies headaches, seizures and numbness.  PSYCHIATRIC: Denies depression or anxiety.  ENDOCRINE: Denies heat or cold intolerance and excessive thirst or urination.    Medical History:   No past medical history on file.    Surgical History:  Past Surgical History:   Procedure Laterality Date    TUBAL LIGATION         Family History:   Family History   Problem Relation Age of Onset    Hyperlipidemia Mother     Diabetes Maternal Grandmother        Social History:   Social History     Tobacco Use    Smoking status: Never   Substance Use " "Topics    Alcohol use: No    Drug use: No       Allergies: Reviewed    Home Medications:   Medication List with Changes/Refills   New Medications    OMEPRAZOLE (PRILOSEC) 40 MG CAPSULE    Take 1 capsule (40 mg total) by mouth once daily.   Current Medications    AMOXICILLIN (AMOXIL) 500 MG CAPSULE    Take 500 mg by mouth 3 (three) times daily.    FAMOTIDINE (PEPCID) 20 MG TABLET    Take 1 tablet (20 mg total) by mouth 2 (two) times daily.    SUCRALFATE (CARAFATE) 1 GRAM TABLET    Take 1 tablet (1 g total) by mouth 4 (four) times daily before meals and nightly.         Physical Exam:  Vital Signs:  Pulse 86   Ht 5' 3" (1.6 m)   Wt 115.4 kg (254 lb 6.6 oz)   BMI 45.07 kg/m²   Body mass index is 45.07 kg/m².      GENERAL: No acute distress, A&Ox3  EYES: Anicteric, no pallor noted.  ENT: OP clear  NECK: Supple, no masses, no thyromegally.  CHEST: Equal breath sounds bilaterally, no wheezing.  CARDIOVASCULAR: Regular rate and rhythm. Murmurs, rubs and gallops absent.  ABDOMEN: soft, non-tender, non-distended, normal bowel sounds, no hepatosplenomegaly   EXTREMITIES: No clubbing, cyanosis or edema.  SKIN: Without lesion or erythema.  LYMPH: No cervical, axillary lymphadenopathy palpable.   NEUROLOGICAL: Grossly normal, no asterixis present.    Labs: Pertinent labs reviewed.    Assessment and Plan:  Gastroesophageal reflux disease, unspecified whether esophagitis present  No alarming features.  Start with trial of ppi once daily.  Counseled on how to take medication.  Discussed weight reduction and lifestyle management.  If no improvement, will consider EGD but not necessary at this time.    -     Ambulatory referral/consult to Gastroenterology  -     H. pylori antigen, stool; Future; Expected date: 04/03/2023    Bloating  Check stool for h pylori   -     H. pylori antigen, stool; Future; Expected date: 04/03/2023    Other orders  -     omeprazole (PRILOSEC) 40 MG capsule; Take 1 capsule (40 mg total) by mouth once " daily.  Dispense: 30 capsule; Refill: 11          Follow up if symptoms worsen or fail to improve.        Thank you so much for allowing me to participate in the care of Sandrine Funes MD

## 2023-04-20 ENCOUNTER — LAB VISIT (OUTPATIENT)
Dept: LAB | Facility: HOSPITAL | Age: 43
End: 2023-04-20
Attending: INTERNAL MEDICINE

## 2023-04-20 DIAGNOSIS — R14.0 BLOATING: ICD-10-CM

## 2023-04-20 DIAGNOSIS — K21.9 GASTROESOPHAGEAL REFLUX DISEASE, UNSPECIFIED WHETHER ESOPHAGITIS PRESENT: ICD-10-CM

## 2023-04-20 PROCEDURE — 87338 HPYLORI STOOL AG IA: CPT | Performed by: INTERNAL MEDICINE

## 2023-04-27 LAB
H PYLORI AG STL QL IA: DETECTED
SPECIMEN SOURCE: ABNORMAL

## 2023-04-27 RX ORDER — METRONIDAZOLE 500 MG/1
500 TABLET ORAL EVERY 8 HOURS
Qty: 42 TABLET | Refills: 0 | Status: SHIPPED | OUTPATIENT
Start: 2023-04-27 | End: 2023-05-11

## 2023-04-27 RX ORDER — CLARITHROMYCIN 500 MG/1
500 TABLET, FILM COATED ORAL 2 TIMES DAILY
Qty: 28 TABLET | Refills: 0 | Status: SHIPPED | OUTPATIENT
Start: 2023-04-27 | End: 2023-05-11

## 2023-04-27 RX ORDER — PANTOPRAZOLE SODIUM 40 MG/1
40 TABLET, DELAYED RELEASE ORAL 2 TIMES DAILY
Qty: 28 TABLET | Refills: 0 | Status: SHIPPED | OUTPATIENT
Start: 2023-04-27 | End: 2023-05-11